# Patient Record
Sex: FEMALE | Race: WHITE | NOT HISPANIC OR LATINO
[De-identification: names, ages, dates, MRNs, and addresses within clinical notes are randomized per-mention and may not be internally consistent; named-entity substitution may affect disease eponyms.]

---

## 2018-07-23 ENCOUNTER — APPOINTMENT (OUTPATIENT)
Dept: VASCULAR SURGERY | Facility: CLINIC | Age: 38
End: 2018-07-23
Payer: COMMERCIAL

## 2018-07-23 VITALS
SYSTOLIC BLOOD PRESSURE: 111 MMHG | WEIGHT: 120 LBS | TEMPERATURE: 98.5 F | HEIGHT: 67.5 IN | DIASTOLIC BLOOD PRESSURE: 75 MMHG | BODY MASS INDEX: 18.61 KG/M2 | HEART RATE: 64 BPM

## 2018-07-23 PROCEDURE — 93970 EXTREMITY STUDY: CPT

## 2018-07-23 PROCEDURE — 99242 OFF/OP CONSLTJ NEW/EST SF 20: CPT

## 2018-09-19 ENCOUNTER — APPOINTMENT (OUTPATIENT)
Dept: ULTRASOUND IMAGING | Facility: CLINIC | Age: 38
End: 2018-09-19

## 2018-09-19 ENCOUNTER — OUTPATIENT (OUTPATIENT)
Dept: OUTPATIENT SERVICES | Facility: HOSPITAL | Age: 38
LOS: 1 days | End: 2018-09-19
Payer: COMMERCIAL

## 2018-09-19 ENCOUNTER — TRANSCRIPTION ENCOUNTER (OUTPATIENT)
Age: 38
End: 2018-09-19

## 2018-09-19 DIAGNOSIS — Z00.8 ENCOUNTER FOR OTHER GENERAL EXAMINATION: ICD-10-CM

## 2018-09-19 PROCEDURE — 76705 ECHO EXAM OF ABDOMEN: CPT

## 2018-09-19 PROCEDURE — 76705 ECHO EXAM OF ABDOMEN: CPT | Mod: 26

## 2018-10-08 ENCOUNTER — APPOINTMENT (OUTPATIENT)
Dept: VASCULAR SURGERY | Facility: CLINIC | Age: 38
End: 2018-10-08
Payer: COMMERCIAL

## 2018-10-08 ENCOUNTER — NON-APPOINTMENT (OUTPATIENT)
Age: 38
End: 2018-10-08

## 2018-10-08 VITALS
BODY MASS INDEX: 18.77 KG/M2 | SYSTOLIC BLOOD PRESSURE: 111 MMHG | DIASTOLIC BLOOD PRESSURE: 75 MMHG | TEMPERATURE: 98 F | HEART RATE: 61 BPM | HEIGHT: 67.5 IN | WEIGHT: 121 LBS

## 2018-10-08 PROCEDURE — 99212 OFFICE O/P EST SF 10 MIN: CPT

## 2018-11-27 ENCOUNTER — TRANSCRIPTION ENCOUNTER (OUTPATIENT)
Age: 38
End: 2018-11-27

## 2018-11-28 ENCOUNTER — APPOINTMENT (OUTPATIENT)
Dept: VASCULAR SURGERY | Facility: CLINIC | Age: 38
End: 2018-11-28
Payer: COMMERCIAL

## 2018-11-28 PROCEDURE — 36478 ENDOVENOUS LASER 1ST VEIN: CPT | Mod: LT

## 2018-11-28 PROCEDURE — 37765 STAB PHLEB VEINS XTR 10-20: CPT | Mod: LT

## 2018-12-04 ENCOUNTER — APPOINTMENT (OUTPATIENT)
Dept: VASCULAR SURGERY | Facility: CLINIC | Age: 38
End: 2018-12-04
Payer: COMMERCIAL

## 2018-12-04 PROCEDURE — 93971 EXTREMITY STUDY: CPT

## 2019-02-19 ENCOUNTER — APPOINTMENT (OUTPATIENT)
Dept: VASCULAR SURGERY | Facility: CLINIC | Age: 39
End: 2019-02-19
Payer: COMMERCIAL

## 2019-02-19 VITALS
BODY MASS INDEX: 18.77 KG/M2 | WEIGHT: 121 LBS | HEIGHT: 67.5 IN | DIASTOLIC BLOOD PRESSURE: 73 MMHG | SYSTOLIC BLOOD PRESSURE: 107 MMHG | TEMPERATURE: 98 F | HEART RATE: 69 BPM

## 2019-02-19 DIAGNOSIS — I87.2 VENOUS INSUFFICIENCY (CHRONIC) (PERIPHERAL): ICD-10-CM

## 2019-02-19 DIAGNOSIS — I83.813 VARICOSE VEINS OF BILATERAL LOWER EXTREMITIES WITH PAIN: ICD-10-CM

## 2019-02-19 PROCEDURE — 99024 POSTOP FOLLOW-UP VISIT: CPT

## 2019-02-19 NOTE — PHYSICAL EXAM
[2+] : left 2+ [Varicose Veins Of Lower Extremities] : present [Varicose Veins Of The Right Leg] : of the right leg [Ankle Swelling On The Right] : mild [No Rash or Lesion] : No rash or lesion [Alert] : alert [Oriented to Person] : oriented to person [Oriented to Place] : oriented to place [Oriented to Time] : oriented to time [Calm] : calm [Ankle Swelling (On Exam)] : not present [] : not present [de-identified] : well  [FreeTextEntry1] : Bilateral lower extremities no edema on exam. Generalized reticular veins noted. Left leg multiple stab incisions completely healed. No ecchymosis or tenderness on palpation.  [de-identified] : CUAUHTEMOCL  [de-identified] : Cooperative

## 2019-02-19 NOTE — ASSESSMENT
[Arterial/Venous Disease] : arterial/venous disease [Other: _____] : [unfilled] [FreeTextEntry1] : 38 y/o f w/ symptomatic venous insufficiency s/p left leg vein ablation and stab phlebectomies doing well post procedure. Has no complaints today.\par Discussed w/ pt. she does have reflux in the SSV of the left leg as well as the right leg GSV and SSV reflux. Risks and benefits of EVLT treatment discussed and as per pt her s/s on the left leg have significantly improved and she currently denies symptoms on the right leg. As of now she would like to hold off on further interventions and will revisit the option in the future. \par \par Medical Conservative Management skin moisturizer, leg elevation prn and compression stockings daily\par She may RTO on an as needed basis or sooner if she develops worsening of symptoms

## 2019-02-19 NOTE — HISTORY OF PRESENT ILLNESS
[FreeTextEntry1] : 40 y/o f w/ history of venous insufficiency and symptomatic varicosities s/p left GSV EVLT and Stab Phlebectomies w/ Dr. Lipscomb Nov 2018. Post doppler 12/4/19 showed GSV was closed and was negative for DVT. She presents today for a f/u visit and is overall happy with her results. She reports her initial symptoms of heaviness and pressure in the left leg have significantly improved. Currently she denies any problems on the right leg. No new problems since procedure. 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

## 2022-11-29 ENCOUNTER — TRANSCRIPTION ENCOUNTER (OUTPATIENT)
Age: 42
End: 2022-11-29

## 2022-11-29 ENCOUNTER — APPOINTMENT (OUTPATIENT)
Dept: BREAST CENTER | Facility: CLINIC | Age: 42
End: 2022-11-29

## 2022-11-29 VITALS
HEIGHT: 67.5 IN | HEART RATE: 70 BPM | DIASTOLIC BLOOD PRESSURE: 71 MMHG | SYSTOLIC BLOOD PRESSURE: 106 MMHG | WEIGHT: 124 LBS | BODY MASS INDEX: 19.24 KG/M2

## 2022-11-29 DIAGNOSIS — Z80.52 FAMILY HISTORY OF MALIGNANT NEOPLASM OF BLADDER: ICD-10-CM

## 2022-11-29 DIAGNOSIS — Z80.6 FAMILY HISTORY OF LEUKEMIA: ICD-10-CM

## 2022-11-29 DIAGNOSIS — Z80.0 FAMILY HISTORY OF MALIGNANT NEOPLASM OF DIGESTIVE ORGANS: ICD-10-CM

## 2022-11-29 DIAGNOSIS — Z82.3 FAMILY HISTORY OF STROKE: ICD-10-CM

## 2022-11-29 DIAGNOSIS — D24.2 BENIGN NEOPLASM OF LEFT BREAST: ICD-10-CM

## 2022-11-29 DIAGNOSIS — Z78.9 OTHER SPECIFIED HEALTH STATUS: ICD-10-CM

## 2022-11-29 DIAGNOSIS — Z83.2 FAMILY HISTORY OF DISEASES OF THE BLOOD AND BLOOD-FORMING ORGANS AND CERTAIN DISORDERS INVOLVING THE IMMUNE MECHANISM: ICD-10-CM

## 2022-11-29 DIAGNOSIS — Z80.3 FAMILY HISTORY OF MALIGNANT NEOPLASM OF BREAST: ICD-10-CM

## 2022-11-29 DIAGNOSIS — Z91.89 OTHER SPECIFIED PERSONAL RISK FACTORS, NOT ELSEWHERE CLASSIFIED: ICD-10-CM

## 2022-11-29 DIAGNOSIS — Z80.51 FAMILY HISTORY OF MALIGNANT NEOPLASM OF KIDNEY: ICD-10-CM

## 2022-11-29 DIAGNOSIS — R92.2 INCONCLUSIVE MAMMOGRAM: ICD-10-CM

## 2022-11-29 DIAGNOSIS — Z63.4 DISAPPEARANCE AND DEATH OF FAMILY MEMBER: ICD-10-CM

## 2022-11-29 DIAGNOSIS — Z80.8 FAMILY HISTORY OF MALIGNANT NEOPLASM OF OTHER ORGANS OR SYSTEMS: ICD-10-CM

## 2022-11-29 DIAGNOSIS — D57.3 SICKLE-CELL TRAIT: ICD-10-CM

## 2022-11-29 DIAGNOSIS — R92.8 OTHER ABNORMAL AND INCONCLUSIVE FINDINGS ON DIAGNOSTIC IMAGING OF BREAST: ICD-10-CM

## 2022-11-29 DIAGNOSIS — N02.9 RECURRENT AND PERSISTENT HEMATURIA WITH UNSPECIFIED MORPHOLOGIC CHANGES: ICD-10-CM

## 2022-11-29 PROCEDURE — 99244 OFF/OP CNSLTJ NEW/EST MOD 40: CPT

## 2022-11-29 RX ORDER — MULTIVITAMIN
TABLET ORAL
Refills: 0 | Status: DISCONTINUED | COMMUNITY
End: 2022-11-29

## 2022-11-29 RX ORDER — LIDOCAINE HYDROCHLORIDE 10 MG/ML
1 INJECTION, SOLUTION INFILTRATION; PERINEURAL
Qty: 5 | Refills: 0 | Status: DISCONTINUED | COMMUNITY
Start: 2018-11-14 | End: 2022-11-29

## 2022-11-29 RX ORDER — ADHESIVE TAPE 3"X 2.3 YD
50 MCG TAPE, NON-MEDICATED TOPICAL
Refills: 0 | Status: DISCONTINUED | COMMUNITY
End: 2022-11-29

## 2022-11-29 RX ORDER — ALPRAZOLAM 0.5 MG/1
0.5 TABLET ORAL
Qty: 1 | Refills: 0 | Status: DISCONTINUED | COMMUNITY
Start: 2018-11-15 | End: 2022-11-29

## 2022-11-29 RX ORDER — SODIUM BICARBONATE 84 MG/ML
8.4 INJECTION, SOLUTION INTRAVENOUS
Qty: 1 | Refills: 0 | Status: DISCONTINUED | COMMUNITY
Start: 2018-11-14 | End: 2022-11-29

## 2022-11-29 RX ORDER — BIOTIN 10 MG
TABLET ORAL
Refills: 0 | Status: DISCONTINUED | COMMUNITY
End: 2022-11-29

## 2022-11-29 RX ORDER — SODIUM CHLORIDE 9 G/ML
0.9 INJECTION, SOLUTION INTRAVENOUS
Qty: 1 | Refills: 0 | Status: DISCONTINUED | COMMUNITY
Start: 2018-11-14 | End: 2022-11-29

## 2022-11-29 RX ORDER — BACILLUS COAGULANS/INULIN 1B-250 MG
CAPSULE ORAL
Refills: 0 | Status: DISCONTINUED | COMMUNITY
End: 2022-11-29

## 2022-11-29 SDOH — SOCIAL STABILITY - SOCIAL INSECURITY: DISSAPEARANCE AND DEATH OF FAMILY MEMBER: Z63.4

## 2022-11-29 NOTE — PROCEDURE
[FreeTextEntry1] : Right breast ultrasound [FreeTextEntry2] : Assess palpable mass [FreeTextEntry3] : The right 8:00 N3 breast shows a well defined oval hypoechoic mass with internal septations [id number] mm.  This is most consistent with a fibroadenoma.

## 2022-11-29 NOTE — DATA REVIEWED
[FreeTextEntry1] : Bilateral mammogram ([facility])  6/14/2022:  No evidence of malignancy.\par \par Bilateral breast ultrasound 9/26/2022:  Right 2N2 6x3x6 mm stable hypoechoic nodule\par                                                                        4 N1 9x4x9 mm stable hypoechoic nodule\par                                                                        8 N4 [id number] mm benign lipoma, slightly larger (16x7 mm in 4/21)\par                                                               Left 2N1 5x3x6 mm stable hypoechoic nodule\par                                                                       11N2 10x5x9 mm hypoechoic nodule previously biopsied, questionably increased (originally [id number] mm in 4/21)\par                                                              Follow-up ultrasound in 6 months\par \par (Images reviewed on the [facility] portal and discs that were brought.)

## 2022-11-29 NOTE — HISTORY OF PRESENT ILLNESS
[FreeTextEntry1] : This is a 42 year old  premenopausal female who had a core biopsy of her left breast in 5/21 showing a fibroadenoma.  She has had other findings on her breast imaging and it was suggested she establish with a breast surgeon.  She does BSE and hasn't felt anything.

## 2022-11-29 NOTE — PHYSICAL EXAM
[EOMI] : extra ocular movement intact [Sclera nonicteric] : sclera nonicteric [Supple] : supple [No Supraclavicular Adenopathy] : no supraclavicular adenopathy [No Cervical Adenopathy] : no cervical adenopathy [No Thyromegaly] : no thyromegaly [Clear to Auscultation Bilat] : clear to auscultation bilaterally [Normal Sinus Rhythm] : normal sinus rhythm [Normal S1, S2] : normal S1 and S2 [Examined in the supine and seated position] : examined in the supine and seated position [No dominant masses] : no dominant masses left breast [No Nipple Retraction] : no left nipple retraction [No Nipple Discharge] : no left nipple discharge [No Axillary Lymphadenopathy] : no left axillary lymphadenopathy [Soft] : abdomen soft [Not Tender] : non-tender [No Palpable Masses] : no abdominal mass palpated [de-identified] : 2 cm rubbery oval mobile mass 8:00

## 2022-11-29 NOTE — PAST MEDICAL HISTORY
[Menarche Age ____] : age at menarche was [unfilled] [Total Preg ___] : G[unfilled] [Live Births ___] : P[unfilled]  [Age At Live Birth ___] : Age at live birth: [unfilled] [FreeTextEntry2] : 2 daughters [FreeTextEntry7] : x 25 years [FreeTextEntry8] : 7-12 months

## 2022-12-02 ENCOUNTER — OUTPATIENT (OUTPATIENT)
Dept: OUTPATIENT SERVICES | Facility: HOSPITAL | Age: 42
LOS: 1 days | Discharge: HOME | End: 2022-12-02

## 2022-12-02 DIAGNOSIS — Z15.89 GENETIC SUSCEPTIBILITY TO OTHER DISEASE: ICD-10-CM

## 2022-12-06 ENCOUNTER — APPOINTMENT (OUTPATIENT)
Dept: HEMATOLOGY ONCOLOGY | Facility: CLINIC | Age: 42
End: 2022-12-06

## 2022-12-14 ENCOUNTER — APPOINTMENT (OUTPATIENT)
Dept: MRI IMAGING | Facility: CLINIC | Age: 42
End: 2022-12-14

## 2022-12-14 ENCOUNTER — OUTPATIENT (OUTPATIENT)
Dept: OUTPATIENT SERVICES | Facility: HOSPITAL | Age: 42
LOS: 1 days | End: 2022-12-14
Payer: COMMERCIAL

## 2022-12-14 DIAGNOSIS — Z91.89 OTHER SPECIFIED PERSONAL RISK FACTORS, NOT ELSEWHERE CLASSIFIED: ICD-10-CM

## 2022-12-14 PROCEDURE — A9585: CPT

## 2022-12-14 PROCEDURE — C8908: CPT

## 2022-12-14 PROCEDURE — C8937: CPT

## 2022-12-14 PROCEDURE — 77049 MRI BREAST C-+ W/CAD BI: CPT | Mod: 26

## 2022-12-22 NOTE — DISCUSSION/SUMMARY
[FreeTextEntry1] : REASON FOR CONSULT\par Hari Spain is a 42-year-old female who was referred by Dr. Casiano for cancer genetic counseling and risk assessment due to her family history of cancer. \par \par RELEVANT MEDICAL HISTORY\par Ms. Spain is a healthy individual who has never had cancer. She has a family history of cancer, see below.\par \par OTHER MEDICAL AND SURGICAL HISTORY:\par Medical: Varicose veins, sickle cell trait, hematuria () – reported inconclusive cause \par Surgical: Ablation, stab phlebectomy\par \par PAST OB/GYN HISTORY:\par Obstetrical History: \par Age at Menarche: 13\par Premenopausal \par Age at First Live Birth: 30\par Oral Contraceptive Use: Yes, 10-12 years total\par IUD Use: 10 years total\par Hormone Replacement Therapy: No\par \par CANCER SCREENING HISTORY:  \par Breast: \par •	Most recent mammo: 2022, bilateral; Results: BIRADS2 benign, no mammographic evidence of malignancy; Frequency: yearly\par •	Most recent sono: 2022, bilateral; Results: BIRADS3 probably benign findings, bilateral benign appearing nodules, unspecified breast nodule N63; Frequency: follow-up with bilateral US in 6 months\par •	No prior breast MRI, scheduled for next week\par •	Breast Biopsies: 1) 2021, US-guided percutaneous L-breast biopsy, Results: fibroadenoma\par GYN:\par •	Most recent GYN visit: 2021; Frequency: yearly\par •	Most recent pap smear: ; Results: reported normal, previously abnormal pap results reported (no records available at this time); Frequency: yearly\par •	Most recent transvaginal ultrasound: ; Results: reported normal\par Colon:\par •	No prior colonoscopy\par Skin:  \par •	Most recent skin exam: 2022; Results: reported no concerning lesions removed; Frequency: yearly\par \par SOCIAL HISTORY:\par •	\par •	Tobacco-product use: Former smoker in college\par •	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 No Ashkenazi [place] heritage reported. Consanguinity was denied. A detailed family history of cancer was ascertained, see below and scanned chart for pedigree. \par \par To Ms. Spain’s knowledge, no one in the family has had germline testing for cancer susceptibility.  \par 	\par 	RISK ASSESSMENT:\par Ms. Spain’s family history of her maternal aunts with mouth cancer (dx teens), bladder cancer (dx mid 50s), and mesothelioma (dx late 50s), her maternal great aunt with breast cancer (dx 60), her maternal grandfather with larynx cancer, her paternal uncles with leukemia (dx teens) and kidney cancer (dx late 60s), her paternal grandfather with kidney cancer (dx late 40s), and her paternal grandmother with colon cancer (late 70s) may be suggestive of a hereditary cancer predisposition syndrome. It was noted that without knowing the specific type of kidney cancers, it is unclear if she meets National Comprehensive Cancer Network (NCCN) criteria for genetic testing. \par \par It was explained that the most informative strategy is to begin genetic testing with an affected relative, or someone most closely related to one. If a cancer-predisposing mutation is detected, other family members, including the patient, can undergo targeted testing. As such, the patient’s father was identified as a more appropriate candidate for testing related to her paternal family history of cancer. Ms. Spain expressed understanding but prefers to pursue her own genetic testing as she concerned about her family history. Insurance coverage and potential out of pocket costs were also discussed, and she understands there may be an out-of-pocket cost associated with testing. \par \par We discussed genetic testing including a Renal/Urinary Tract Cancers Panel, a Colorectal Cancer Guidelines-Based Panel, and a Breast and Gyn Cancers Guidelines-Based Panel. This test analyzes 47 genes: APC, RODRIGO, AXIN2, BAP1, BARD1, BMPR1A, BRCA1, BRCA2, BRIP1, CDC73, CDH1, CDKN1C, CHEK2, DICER1, DIS3L2, EPCAM, FH, FLCN, GPC3, GREM1, MET, MLH1, MSH2, MSH3, MSH6, MUTYH, NF1, NTHL1, PALB2, PMS2, POLD1, POLE, PTEN, RAD51C, RAD51D, REST, SDHB, SDHC, SMAD4, SMARCA4, SMARCB1, STK11, TP53, TSC1, TSC2, VHL, WT1.\par \par The risks, benefits and limitations of genetic testing were discussed with Ms. Spain. In addition, we discussed the purpose of genetic testing and possible test results (positive, negative, inconclusive) along with associated medical management options and psychosocial implications. The Genetic Information Non-discrimination Act (LINNEA) was also reviewed, and Ms. Spain had no concerns at this time.\par \par Ms. Spain consented to the above-mentioned genetic testing panel. Blood was drawn in our laboratory and sent to olooke today.\par \par PLAN:\par \par 1.	Blood drawn today will be sent to [facility] for analysis. \par 2.	We will contact Ms. Spain to schedule a follow-up appointment once the results are available. Results generally return in 2-3 weeks. \par \par \par \par For any additional questions please call [facility] at [phone]. \par \par \par Mary Rogers, MS\par Genetic Counselor, [facility]\par \par \par CC: Patient\par Dr. Casiano

## 2023-01-05 ENCOUNTER — FORM ENCOUNTER (OUTPATIENT)
Age: 43
End: 2023-01-05

## 2023-01-09 ENCOUNTER — NON-APPOINTMENT (OUTPATIENT)
Age: 43
End: 2023-01-09

## 2023-01-09 NOTE — DISCUSSION/SUMMARY
[FreeTextEntry1] : REASON FOR CONSULT\par Hari Spain is a 42-year-old female who was called on 1/9/2023 for a discussion regarding her negative genetic testing results related to hereditary cancer predisposition. \par \par Ms. Spain was originally seen at [facility] on 12/6/2022 for hereditary cancer predisposition risk assessment. Ms. Spain is a healthy individual who has never had cancer. She has a family history of cancer Ms. Spain decided to pursue genetic testing using a Renal/Urinary Tract Cancers Panel, a Colorectal Cancer Guidelines-Based Panel, and a Breast and Gyn Cancers Guidelines-Based Panel offered by [facility]par \par TEST RESULTS: NEGATIVE\par \par NO pathogenic (disease-causing) variants or VUSs were detected in the following 47 genes:  APC, RODRIGO, AXIN2, BAP1, BARD1, BMPR1A, BRCA1, BRCA2, BRIP1, CDC73, CDH1, CDKN1C, CHEK2, DICER1, DIS3L2, EPCAM, FH, FLCN, GPC3, GREM1, MET, MLH1, MSH2, MSH3, MSH6, MUTYH, NF1, NTHL1, PALB2, PMS2, POLD1, POLE, PTEN, RAD51C, RAD51D, REST, SDHB, SDHC, SMAD4, SMARCA4, SMARCB1, STK11, TP53, TSC1, TSC2, VHL, WT1.\par \par RESULTS INTERPRETATION AND ASSESSMENT:\par Given Ms. Spain’s current reported family history of cancer, and her negative genetic test results, the following screening guidelines and risk-reducing recommendations were discussed:\par \par COLON: We discussed that with a family history of a second-degree relative with colorectal cancer at any age (Ms. Spain’s paternal grandmother dx late 70s), screening colonoscopies should begin at age 45 and repeat every 10 years if negative as per the National Comprehensive Cancer Network (NCCN) Colorectal Cancer Screening guidelines\par \par OTHER: In the absence of other indications, Ms. Spain should practice age-appropriate cancer screening of other organ systems as recommended for the general population.\par \par We also discussed the limitations of negative results:\par 1.	The cause of Ms. Spain’s family history of cancer remains unknown. The cancer(s) may have developed randomly, or due to environmental factors.  \par 2.	This negative result does not completely rule out a hereditary basis for the reported family history due to limitations in technology or a variant being present in an unidentified gene. \par 3.	Variants in other genes would not be identified by this analysis, so this negative result does not rule out the low likelihood of having a mutation in a different hereditary cancer gene or the possibility of ever developing cancer.\par 4.	It is possible there is a hereditary cancer predisposition gene mutation in the family, but the patient did not inherit it. \par \par We informed Ms. Spain that our knowledge of genetics and inherited cancer conditions is changing rapidly. Therefore, we recommended that Ms. Spain contact our office, every 2 to 3 years, to discuss relevant advances in cancer genetics.  We emphasized the importance of re-contacting us with updates regarding her personal and family history of cancer as well as any updates regarding additional cancer genetic test results performed for the patient and/or family members.  Such updates could possibly change our risk assessment and recommendations. \par \par In addition, we discussed Ms. Spain’s father consider pursuing cancer risk assessment genetic counseling with the option of genetic testing due to his family history of two first-degree relatives (father and brother) with a reported history of kidney cancer. Additionally, Ms. Spain’s first  passed away from colon cancer, which was diagnosed at age 42. We therefore discussed that his parents and siblings can also consider pursuing cancer risk assessment genetic counseling with the option of genetic testing. Her children may consider genetic counseling and genetic testing in the future given their father’s history of cancer, however, please note, we do not recommend genetic testing for children until they are over the age of 18. \par \par PLAN:\par 1.	These results do not change Ms. Spain’s medical management. Long-term management and surveillance should be based general population guidelines.\par 2.	Patient informed consult note(s) will be available through their [facility] patient portal and genetic test results will be released via [facility]’s laboratory portal. \par 3.	Ms. Spain was encouraged to contact us every 2-3 years to discuss relevant advances in cancer genetics, or sooner if there are any changes in her personal or family history of cancer.\par \par \par CC:\par Patient\par Dr. Casiano